# Patient Record
Sex: FEMALE | Race: BLACK OR AFRICAN AMERICAN | NOT HISPANIC OR LATINO | Employment: UNEMPLOYED | RURAL
[De-identification: names, ages, dates, MRNs, and addresses within clinical notes are randomized per-mention and may not be internally consistent; named-entity substitution may affect disease eponyms.]

---

## 2018-06-20 ENCOUNTER — HISTORICAL (OUTPATIENT)
Dept: ADMINISTRATIVE | Facility: HOSPITAL | Age: 38
End: 2018-06-20

## 2018-06-22 LAB
LAB AP CLINICAL INFORMATION: NORMAL
LAB AP GENERAL CAT - HISTORICAL: NORMAL
LAB AP INTERPRETATION/RESULT - HISTORICAL: NEGATIVE
LAB AP SPECIMEN ADEQUACY - HISTORICAL: NORMAL
LAB AP SPECIMEN SUBMITTED - HISTORICAL: NORMAL

## 2020-08-19 ENCOUNTER — HISTORICAL (OUTPATIENT)
Dept: ADMINISTRATIVE | Facility: HOSPITAL | Age: 40
End: 2020-08-19

## 2022-05-18 ENCOUNTER — OFFICE VISIT (OUTPATIENT)
Dept: FAMILY MEDICINE | Facility: CLINIC | Age: 42
End: 2022-05-18
Payer: COMMERCIAL

## 2022-05-18 DIAGNOSIS — Z00.00 WELLNESS EXAMINATION: Primary | ICD-10-CM

## 2022-05-18 PROBLEM — M25.551 CHRONIC RIGHT HIP PAIN: Status: ACTIVE | Noted: 2022-05-18

## 2022-05-18 PROBLEM — I10 PRIMARY HYPERTENSION: Status: ACTIVE | Noted: 2022-05-18

## 2022-05-18 PROBLEM — E66.9 OBESITY: Status: ACTIVE | Noted: 2022-05-18

## 2022-05-18 PROBLEM — F32.A DEPRESSION: Status: ACTIVE | Noted: 2022-05-18

## 2022-05-18 PROBLEM — G89.29 CHRONIC RIGHT HIP PAIN: Status: ACTIVE | Noted: 2022-05-18

## 2022-05-18 PROBLEM — T78.40XA ALLERGIES: Status: ACTIVE | Noted: 2022-05-18

## 2022-05-18 LAB
CHOLEST SERPL-MCNC: 179 MG/DL (ref 0–200)
CHOLEST/HDLC SERPL: 3.4 {RATIO}
GLUCOSE SERPL-MCNC: 103 MG/DL (ref 74–106)
HDLC SERPL-MCNC: 53 MG/DL (ref 40–60)
LDLC SERPL CALC-MCNC: 104 MG/DL
LDLC/HDLC SERPL: 2 {RATIO}
NONHDLC SERPL-MCNC: 126 MG/DL
TRIGL SERPL-MCNC: 112 MG/DL (ref 35–150)
VLDLC SERPL-MCNC: 22 MG/DL

## 2022-05-18 PROCEDURE — 82947 ASSAY GLUCOSE BLOOD QUANT: CPT | Mod: ,,, | Performed by: CLINICAL MEDICAL LABORATORY

## 2022-05-18 PROCEDURE — 99386 PR PREVENTIVE VISIT,NEW,40-64: ICD-10-PCS | Mod: ,,, | Performed by: NURSE PRACTITIONER

## 2022-05-18 PROCEDURE — 4010F PR ACE/ARB THEARPY RXD/TAKEN: ICD-10-PCS | Mod: CPTII,,, | Performed by: NURSE PRACTITIONER

## 2022-05-18 PROCEDURE — 99386 PREV VISIT NEW AGE 40-64: CPT | Mod: ,,, | Performed by: NURSE PRACTITIONER

## 2022-05-18 PROCEDURE — 82947 GLUCOSE, FASTING: ICD-10-PCS | Mod: ,,, | Performed by: CLINICAL MEDICAL LABORATORY

## 2022-05-18 PROCEDURE — 4010F ACE/ARB THERAPY RXD/TAKEN: CPT | Mod: CPTII,,, | Performed by: NURSE PRACTITIONER

## 2022-05-18 PROCEDURE — 80061 LIPID PANEL: CPT | Mod: ,,, | Performed by: CLINICAL MEDICAL LABORATORY

## 2022-05-18 PROCEDURE — 80061 LIPID PANEL: ICD-10-PCS | Mod: ,,, | Performed by: CLINICAL MEDICAL LABORATORY

## 2022-05-18 RX ORDER — LISINOPRIL AND HYDROCHLOROTHIAZIDE 12.5; 2 MG/1; MG/1
1 TABLET ORAL DAILY
COMMUNITY

## 2022-05-18 RX ORDER — PHENTERMINE HYDROCHLORIDE 37.5 MG/1
37.5 CAPSULE ORAL EVERY MORNING
COMMUNITY

## 2022-05-18 RX ORDER — SERTRALINE HYDROCHLORIDE 50 MG/1
50 TABLET, FILM COATED ORAL DAILY
COMMUNITY

## 2022-05-18 RX ORDER — CETIRIZINE HYDROCHLORIDE 10 MG/1
10 TABLET ORAL DAILY
COMMUNITY

## 2022-05-18 RX ORDER — TRAMADOL HYDROCHLORIDE 50 MG/1
50 TABLET ORAL EVERY 6 HOURS PRN
COMMUNITY
End: 2023-10-09

## 2023-06-20 ENCOUNTER — OFFICE VISIT (OUTPATIENT)
Dept: GASTROENTEROLOGY | Facility: CLINIC | Age: 43
End: 2023-06-20
Payer: COMMERCIAL

## 2023-06-20 VITALS
HEIGHT: 62 IN | WEIGHT: 293 LBS | BODY MASS INDEX: 53.92 KG/M2 | SYSTOLIC BLOOD PRESSURE: 116 MMHG | DIASTOLIC BLOOD PRESSURE: 61 MMHG

## 2023-06-20 DIAGNOSIS — R10.84 GENERALIZED ABDOMINAL PAIN: Primary | ICD-10-CM

## 2023-06-20 DIAGNOSIS — K76.0 FATTY LIVER: ICD-10-CM

## 2023-06-20 PROCEDURE — 3078F DIAST BP <80 MM HG: CPT | Mod: CPTII,,, | Performed by: NURSE PRACTITIONER

## 2023-06-20 PROCEDURE — 99214 OFFICE O/P EST MOD 30 MIN: CPT | Mod: PBBFAC | Performed by: NURSE PRACTITIONER

## 2023-06-20 PROCEDURE — 3078F PR MOST RECENT DIASTOLIC BLOOD PRESSURE < 80 MM HG: ICD-10-PCS | Mod: CPTII,,, | Performed by: NURSE PRACTITIONER

## 2023-06-20 PROCEDURE — 1159F MED LIST DOCD IN RCRD: CPT | Mod: CPTII,,, | Performed by: NURSE PRACTITIONER

## 2023-06-20 PROCEDURE — 1159F PR MEDICATION LIST DOCUMENTED IN MEDICAL RECORD: ICD-10-PCS | Mod: CPTII,,, | Performed by: NURSE PRACTITIONER

## 2023-06-20 PROCEDURE — 3008F BODY MASS INDEX DOCD: CPT | Mod: CPTII,,, | Performed by: NURSE PRACTITIONER

## 2023-06-20 PROCEDURE — 99204 OFFICE O/P NEW MOD 45 MIN: CPT | Mod: S$PBB,,, | Performed by: NURSE PRACTITIONER

## 2023-06-20 PROCEDURE — 3074F SYST BP LT 130 MM HG: CPT | Mod: CPTII,,, | Performed by: NURSE PRACTITIONER

## 2023-06-20 PROCEDURE — 99204 PR OFFICE/OUTPT VISIT, NEW, LEVL IV, 45-59 MIN: ICD-10-PCS | Mod: S$PBB,,, | Performed by: NURSE PRACTITIONER

## 2023-06-20 PROCEDURE — 3008F PR BODY MASS INDEX (BMI) DOCUMENTED: ICD-10-PCS | Mod: CPTII,,, | Performed by: NURSE PRACTITIONER

## 2023-06-20 PROCEDURE — 4010F PR ACE/ARB THEARPY RXD/TAKEN: ICD-10-PCS | Mod: CPTII,,, | Performed by: NURSE PRACTITIONER

## 2023-06-20 PROCEDURE — 3074F PR MOST RECENT SYSTOLIC BLOOD PRESSURE < 130 MM HG: ICD-10-PCS | Mod: CPTII,,, | Performed by: NURSE PRACTITIONER

## 2023-06-20 PROCEDURE — 4010F ACE/ARB THERAPY RXD/TAKEN: CPT | Mod: CPTII,,, | Performed by: NURSE PRACTITIONER

## 2023-06-20 RX ORDER — ERGOCALCIFEROL 1.25 MG/1
CAPSULE ORAL
COMMUNITY
Start: 2023-03-28

## 2023-06-20 RX ORDER — TRAZODONE HYDROCHLORIDE 50 MG/1
50 TABLET ORAL NIGHTLY PRN
COMMUNITY
Start: 2023-01-09

## 2023-06-20 RX ORDER — FOLIC ACID 1 MG/1
1000 TABLET ORAL
COMMUNITY
Start: 2023-03-28

## 2023-06-20 RX ORDER — DICLOFENAC POTASSIUM 50 MG/1
50 TABLET, FILM COATED ORAL 2 TIMES DAILY PRN
COMMUNITY
Start: 2023-05-02

## 2023-06-20 RX ORDER — HYDROCODONE BITARTRATE AND ACETAMINOPHEN 5; 325 MG/1; MG/1
1 TABLET ORAL 2 TIMES DAILY PRN
COMMUNITY
Start: 2023-05-02

## 2023-06-20 NOTE — PROGRESS NOTES
Tracey Gaffney is a 43 y.o. female here for Abdominal Pain        PCP: Primary Doctor No  Referring Provider: No referring provider defined for this encounter.     HPI:  Presents with report of generalized abdominal pain.  Patient did have an abdominal ultrasound at [facility], report reviewed, hepatic steatosis.  Prior cholecystectomy.  She reports that abdominal pain is generalized and she can not identify any trigger.  Reports the pain initially started in the right lower quadrant.  No previous appendectomy.  Abdominal pain is intermittent.  Reports that it is sharp.  No hematochezia or melena.  Denies any heartburn, nausea, or vomiting.  Reports that she is on a weight loss diet in preparation for hip surgery.  We did discuss weight loss of 7-10% body weight due to fatty liver.  BMI is 59.44.  Reports that stools are loose following gallbladder surgery.  This is not new.  No family history of colon cancer.    Abdominal Pain  This is a new problem. The current episode started more than 1 month ago. The onset quality is undetermined. The problem occurs intermittently. The problem has been unchanged. The pain is moderate. The quality of the pain is sharp. The abdominal pain does not radiate. Associated symptoms include arthralgias and diarrhea (loose after gallbladder surgery). Pertinent negatives include no constipation, fever, nausea or vomiting. Nothing aggravates the pain. The pain is relieved by Nothing.       ROS:  Review of Systems   Constitutional:  Negative for appetite change, fatigue, fever and unexpected weight change.   HENT:  Negative for trouble swallowing.    Cardiovascular:  Negative for chest pain.   Gastrointestinal:  Positive for abdominal pain (intermittent sharp pain) and diarrhea (loose after gallbladder surgery). Negative for blood in stool, change in bowel habit, constipation, nausea, vomiting, reflux and change in bowel habit.   Musculoskeletal:  Positive for arthralgias and gait  "problem (cane).   Integumentary:  Negative for pallor.   Neurological:  Negative for light-headedness.   Psychiatric/Behavioral:  The patient is not nervous/anxious.         PMHX:  has no past medical history on file.    PSHX:  has a past surgical history that includes Hernia repair; Hysterectomy; and Cholecystectomy.    PFHX: family history is not on file.    PSlHX:  reports that she has never smoked. She has never used smokeless tobacco. She reports current alcohol use. She reports that she does not use drugs.        Review of patient's allergies indicates:  No Known Allergies    Medication List with Changes/Refills   Current Medications    CETIRIZINE (ZYRTEC) 10 MG TABLET    Take 10 mg by mouth once daily.    DICLOFENAC (CATAFLAM) 50 MG TABLET    Take 50 mg by mouth 2 (two) times daily as needed.    ERGOCALCIFEROL (ERGOCALCIFEROL) 50,000 UNIT CAP    Take by mouth.    FOLIC ACID (FOLVITE) 1 MG TABLET    Take 1,000 mcg by mouth.    HYDROCODONE-ACETAMINOPHEN (NORCO) 5-325 MG PER TABLET    Take 1 tablet by mouth 2 (two) times daily as needed.    LISINOPRIL-HYDROCHLOROTHIAZIDE (PRINZIDE,ZESTORETIC) 20-12.5 MG PER TABLET    Take 1 tablet by mouth once daily.    PHENTERMINE 37.5 MG CAPSULE    Take 37.5 mg by mouth every morning.    SERTRALINE (ZOLOFT) 50 MG TABLET    TAKE 1 TABLET BY MOUTH AT BEDTIME FOR DEPRESSION AND HOT FLASHES.    SERTRALINE (ZOLOFT) 50 MG TABLET    Take 50 mg by mouth once daily.    TRAMADOL (ULTRAM) 50 MG TABLET    Take 50 mg by mouth every 6 (six) hours as needed for Pain.    TRAZODONE (DESYREL) 50 MG TABLET    Take 50 mg by mouth nightly as needed.        Objective Findings:  Vital Signs:  /61   Ht 5' 2" (1.575 m)   Wt (!) 147.4 kg (325 lb)   BMI 59.44 kg/m²  Body mass index is 59.44 kg/m².    Physical Exam:  Physical Exam  Vitals and nursing note reviewed.   Constitutional:       General: She is not in acute distress.     Appearance: Normal appearance. She is obese.   HENT:      " Mouth/Throat:      Mouth: Mucous membranes are moist.   Cardiovascular:      Rate and Rhythm: Normal rate.   Pulmonary:      Effort: Pulmonary effort is normal.      Breath sounds: No wheezing, rhonchi or rales.   Abdominal:      General: Bowel sounds are normal. There is no distension.      Palpations: Abdomen is soft. There is no mass.      Tenderness: There is no abdominal tenderness. There is no guarding.   Skin:     General: Skin is warm and dry.      Coloration: Skin is not jaundiced or pale.   Neurological:      Mental Status: She is alert and oriented to person, place, and time.      Gait: Gait abnormal.   Psychiatric:         Mood and Affect: Mood normal.        Labs:  No results found for: WBC, HGB, HCT, MCV, RDW, PLT, GRAN, LYMPH, MONO, EOS, BASO  No results found for: NA, K, CL, CO2, GLU, BUN, CREATININE, CALCIUM, PROT, ALBUMIN, BILITOT, ALKPHOS, AST, ALT      Imaging: No results found.      Assessment:  Tracey Gaffney is a 43 y.o. female here with:  1. Generalized abdominal pain    2. Fatty liver          Recommendations:  1. Ct abd and pelvis for generalized abdominal pain, abdominal ultrasound already performed  2. CBC, CMP, hepatitis-B surface antibody, hepatitis a total antibody, hepatitis-C  3. Exercise 150 minutes per week  Weight loss of 7-10%. Weight loss should be gradual  Diet low in saturated fats and carbohydrates  Good glucose and cholesterol control      Follow up in about 4 weeks (around 7/18/2023).      Order summary:  Orders Placed This Encounter    CT Abdomen Pelvis With Contrast    CBC Auto Differential    Comprehensive Metabolic Panel    Hepatitis B Surface Ab, Qualitative    Hepatitis A Antibody, Total    Hepatitis C Antibody       Thank you for allowing me to participate in the care of Tracey Gaffney.      SANA Harrison

## 2023-07-03 ENCOUNTER — TELEPHONE (OUTPATIENT)
Dept: GASTROENTEROLOGY | Facility: CLINIC | Age: 43
End: 2023-07-03
Payer: COMMERCIAL

## 2023-07-03 DIAGNOSIS — R10.84 GENERALIZED ABDOMINAL PAIN: Primary | ICD-10-CM

## 2023-07-03 NOTE — TELEPHONE ENCOUNTER
Called patient to give appointment for US abd due to letter from pre cert stating that the [employer] insurance would not cover CT if she didn't have US first. Patient states she had US abd at [place] on 6/26/23. Patient will have them fax report to us at [phone]

## 2023-07-13 ENCOUNTER — TELEPHONE (OUTPATIENT)
Dept: GASTROENTEROLOGY | Facility: CLINIC | Age: 43
End: 2023-07-13
Payer: COMMERCIAL

## 2023-07-13 NOTE — TELEPHONE ENCOUNTER
Call to the patient's insurance company to discuss recent denial of CT abdomen and pelvis due to generalized abdominal pain.  The patient's pain initially started in the right upper quadrant and abdominal ultrasound was performed but not diagnostic with the exception of hepatic steatosis.  The pain is now diffuse and generalized abdominal pain.  Insurance company reports that at this time. A peer to peer. would not give authorization for this testing.  States that the order request for CT abdomen and pelvis has been denied twice.  Reports that at this point an appeal has to be initiated.  Tracking number [id number].

## 2023-07-27 ENCOUNTER — TELEPHONE (OUTPATIENT)
Dept: GASTROENTEROLOGY | Facility: CLINIC | Age: 43
End: 2023-07-27
Payer: COMMERCIAL

## 2023-07-27 DIAGNOSIS — R10.84 GENERALIZED ABDOMINAL PAIN: Primary | ICD-10-CM

## 2023-07-27 NOTE — TELEPHONE ENCOUNTER
Called patient to give update on status of appeal. Patient states she has not yet received forms in the mail from her insurance company, but when she does she will fill out and turn in to our office. Notified patient to turn in a urine sample to lab at her earliest covience. Patient verbalized understanding.              ----- Message from Mary Ellen Rehman sent at 7/27/2023  8:57 AM CDT -----  Good Morning, I spoke with Appeals Team from [facility] because I had been checking online for an update on this request, but there was no update on the appeal. I spoke with Barbara. She stated that patient's insurance mailed out an appeal letter form for patient to fill out and sign. She stated that patient should return this form to the doctor office. Once this has been done it would need to be downloaded on [facility] web portal. She states that it should take a day or so for this information to be reviewed once submitted.     Once you guys receive the information from the patient, could you please download the information into  and I will upload it to their web portal.also, she said they only clinical information that they need now is a urinalysis.      Thank you   Mary Ellen

## 2023-07-28 ENCOUNTER — TELEPHONE (OUTPATIENT)
Dept: GASTROENTEROLOGY | Facility: CLINIC | Age: 43
End: 2023-07-28
Payer: COMMERCIAL

## 2023-07-28 NOTE — TELEPHONE ENCOUNTER
Returned call to patient. States she realized the order was for urine and not blood.          ----- Message from Janice Damon sent at 7/28/2023  8:45 AM CDT -----  Question about urinalysis?

## 2023-08-10 DIAGNOSIS — N39.0 URINARY TRACT INFECTION WITHOUT HEMATURIA, SITE UNSPECIFIED: Primary | ICD-10-CM

## 2023-08-10 RX ORDER — NITROFURANTOIN 25; 75 MG/1; MG/1
100 CAPSULE ORAL 2 TIMES DAILY
Qty: 14 CAPSULE | Refills: 0 | Status: SHIPPED | OUTPATIENT
Start: 2023-08-10 | End: 2023-08-17

## 2023-08-30 ENCOUNTER — OFFICE VISIT (OUTPATIENT)
Dept: GASTROENTEROLOGY | Facility: CLINIC | Age: 43
End: 2023-08-30
Payer: COMMERCIAL

## 2023-08-30 VITALS
HEART RATE: 71 BPM | DIASTOLIC BLOOD PRESSURE: 63 MMHG | BODY MASS INDEX: 53.92 KG/M2 | SYSTOLIC BLOOD PRESSURE: 109 MMHG | HEIGHT: 62 IN | WEIGHT: 293 LBS

## 2023-08-30 DIAGNOSIS — K76.0 FATTY LIVER: ICD-10-CM

## 2023-08-30 DIAGNOSIS — R10.30 LOWER ABDOMINAL PAIN: Primary | ICD-10-CM

## 2023-08-30 PROCEDURE — 3074F SYST BP LT 130 MM HG: CPT | Mod: CPTII,,, | Performed by: NURSE PRACTITIONER

## 2023-08-30 PROCEDURE — 3008F PR BODY MASS INDEX (BMI) DOCUMENTED: ICD-10-PCS | Mod: CPTII,,, | Performed by: NURSE PRACTITIONER

## 2023-08-30 PROCEDURE — 3008F BODY MASS INDEX DOCD: CPT | Mod: CPTII,,, | Performed by: NURSE PRACTITIONER

## 2023-08-30 PROCEDURE — 1159F PR MEDICATION LIST DOCUMENTED IN MEDICAL RECORD: ICD-10-PCS | Mod: CPTII,,, | Performed by: NURSE PRACTITIONER

## 2023-08-30 PROCEDURE — 3074F PR MOST RECENT SYSTOLIC BLOOD PRESSURE < 130 MM HG: ICD-10-PCS | Mod: CPTII,,, | Performed by: NURSE PRACTITIONER

## 2023-08-30 PROCEDURE — 1159F MED LIST DOCD IN RCRD: CPT | Mod: CPTII,,, | Performed by: NURSE PRACTITIONER

## 2023-08-30 PROCEDURE — 4010F PR ACE/ARB THEARPY RXD/TAKEN: ICD-10-PCS | Mod: CPTII,,, | Performed by: NURSE PRACTITIONER

## 2023-08-30 PROCEDURE — 99213 OFFICE O/P EST LOW 20 MIN: CPT | Mod: S$PBB,,, | Performed by: NURSE PRACTITIONER

## 2023-08-30 PROCEDURE — 1160F RVW MEDS BY RX/DR IN RCRD: CPT | Mod: CPTII,,, | Performed by: NURSE PRACTITIONER

## 2023-08-30 PROCEDURE — 1160F PR REVIEW ALL MEDS BY PRESCRIBER/CLIN PHARMACIST DOCUMENTED: ICD-10-PCS | Mod: CPTII,,, | Performed by: NURSE PRACTITIONER

## 2023-08-30 PROCEDURE — 3078F DIAST BP <80 MM HG: CPT | Mod: CPTII,,, | Performed by: NURSE PRACTITIONER

## 2023-08-30 PROCEDURE — 99213 PR OFFICE/OUTPT VISIT, EST, LEVL III, 20-29 MIN: ICD-10-PCS | Mod: S$PBB,,, | Performed by: NURSE PRACTITIONER

## 2023-08-30 PROCEDURE — 3078F PR MOST RECENT DIASTOLIC BLOOD PRESSURE < 80 MM HG: ICD-10-PCS | Mod: CPTII,,, | Performed by: NURSE PRACTITIONER

## 2023-08-30 PROCEDURE — 99214 OFFICE O/P EST MOD 30 MIN: CPT | Mod: PBBFAC | Performed by: NURSE PRACTITIONER

## 2023-08-30 PROCEDURE — 4010F ACE/ARB THERAPY RXD/TAKEN: CPT | Mod: CPTII,,, | Performed by: NURSE PRACTITIONER

## 2023-08-30 NOTE — PROGRESS NOTES
Tracey Gaffney is a 43 y.o. female here for No chief complaint on file.        PCP: No, Primary Doctor  Referring Provider: No referring provider defined for this encounter.     HPI:  Presents in follow-up due to abdominal pain.  Patient continues to report lower abdominal pain.  Urinalysis was completed at the last office visit, she did have a UTI with greater than 100,000 count E coli.  She was treated with Macrobid.  Reports that UTI symptoms have improved.  Despite treatment for UTI, patient continues to report lower abdominal pain that is intermittent and sharp.  CT was previously ordered and denied by insurance.  We have appealed.  Will check with precert.  Abdominal ultrasound performed at [facility] indicates fatty liver. Reports a previous history of enteritis in 2016. CT from 2016 reviewed by patient request on her phone. Denies constipation. States that she has had loose stool after eating for greater than 15 years due to prior cholecystectomy. No hematochezia or melena.  Reports that she needs to reschedule appointment for gyn.  States that she has had a partial hysterectomy.          ROS:  Review of Systems   Constitutional:  Negative for appetite change, fatigue, fever and unexpected weight change.   HENT:  Negative for trouble swallowing.    Respiratory:  Negative for shortness of breath.    Cardiovascular:  Negative for chest pain.   Gastrointestinal:  Positive for abdominal pain and diarrhea (loose after eating, prior cholecystectomy). Negative for blood in stool, change in bowel habit, constipation, nausea, vomiting, reflux and change in bowel habit.   Musculoskeletal:  Negative for gait problem.   Integumentary:  Negative for pallor.   Neurological:  Negative for light-headedness.   Psychiatric/Behavioral:  The patient is not nervous/anxious.           PMHX:  has no past medical history on file.    PSHX:  has a past surgical history that includes Hernia repair; Hysterectomy; and  "Cholecystectomy.    PFHX: family history is not on file.    PSlHX:  reports that she has never smoked. She has never used smokeless tobacco. She reports current alcohol use. She reports that she does not use drugs.        Review of patient's allergies indicates:  No Known Allergies    Medication List with Changes/Refills   Current Medications    CETIRIZINE (ZYRTEC) 10 MG TABLET    Take 10 mg by mouth once daily.    DICLOFENAC (CATAFLAM) 50 MG TABLET    Take 50 mg by mouth 2 (two) times daily as needed.    ERGOCALCIFEROL (ERGOCALCIFEROL) 50,000 UNIT CAP    Take by mouth.    FOLIC ACID (FOLVITE) 1 MG TABLET    Take 1,000 mcg by mouth.    HYDROCODONE-ACETAMINOPHEN (NORCO) 5-325 MG PER TABLET    Take 1 tablet by mouth 2 (two) times daily as needed.    LISINOPRIL-HYDROCHLOROTHIAZIDE (PRINZIDE,ZESTORETIC) 20-12.5 MG PER TABLET    Take 1 tablet by mouth once daily.    PHENTERMINE 37.5 MG CAPSULE    Take 37.5 mg by mouth every morning.    SERTRALINE (ZOLOFT) 50 MG TABLET    TAKE 1 TABLET BY MOUTH AT BEDTIME FOR DEPRESSION AND HOT FLASHES.    SERTRALINE (ZOLOFT) 50 MG TABLET    Take 50 mg by mouth once daily.    TRAMADOL (ULTRAM) 50 MG TABLET    Take 50 mg by mouth every 6 (six) hours as needed for Pain.    TRAZODONE (DESYREL) 50 MG TABLET    Take 50 mg by mouth nightly as needed.        Objective Findings:  Vital Signs:  /63   Pulse 71   Ht 5' 2" (1.575 m)   Wt (!) 147.3 kg (324 lb 12.8 oz)   BMI 59.41 kg/m²  Body mass index is 59.41 kg/m².    Physical Exam:  Physical Exam  Vitals and nursing note reviewed.   Constitutional:       General: She is not in acute distress.     Appearance: Normal appearance. She is obese.   HENT:      Mouth/Throat:      Mouth: Mucous membranes are moist.   Cardiovascular:      Rate and Rhythm: Normal rate.   Pulmonary:      Effort: Pulmonary effort is normal.      Breath sounds: No wheezing, rhonchi or rales.   Abdominal:      General: Bowel sounds are normal. There is no distension. "      Palpations: Abdomen is soft. There is no mass.      Tenderness: There is abdominal tenderness. There is no guarding.      Hernia: No hernia is present.   Skin:     General: Skin is warm and dry.      Coloration: Skin is not jaundiced or pale.   Neurological:      Mental Status: She is alert and oriented to person, place, and time.   Psychiatric:         Mood and Affect: Mood normal.          Labs:  Lab Results   Component Value Date    WBC 7.46 06/20/2023    HGB 11.8 (L) 06/20/2023    HCT 38.6 06/20/2023    MCV 89.1 06/20/2023    RDW 14.2 06/20/2023     06/20/2023    LYMPH 46.0 (H) 06/20/2023    LYMPH 3.43 06/20/2023    MONO 8.7 (H) 06/20/2023    EOS 0.14 06/20/2023    BASO 0.04 06/20/2023     Lab Results   Component Value Date     06/20/2023    K 4.6 06/20/2023     06/20/2023    CO2 26 06/20/2023    GLU 89 06/20/2023    BUN 14 06/20/2023    CREATININE 1.00 06/20/2023    CALCIUM 9.5 06/20/2023    PROT 7.2 06/20/2023    ALBUMIN 3.4 (L) 06/20/2023    BILITOT 0.2 06/20/2023    ALKPHOS 90 06/20/2023    AST 21 06/20/2023    ALT 16 06/20/2023         Imaging: No results found.      Assessment:  Tracey Gaffney is a 43 y.o. female here with:  1. Lower abdominal pain    2. Fatty liver          Recommendations:  1. Will contact precert regarding scheduling CT  2. Exercise 150 minutes per week  Weight loss of 7-10%. Weight loss should be gradual  Diet low in saturated fats and carbohydrates  Good glucose and cholesterol control      Follow up in about 3 months (around 11/30/2023).      Order summary:       Thank you for allowing me to participate in the care of Tracey Gaffney.      SANA Harrison

## 2023-08-30 NOTE — PATIENT INSTRUCTIONS
Recommend Hepatitis A and B vaccine    Exercise 150 minutes per week  Weight loss of 7-10%. Weight loss should be gradual  Diet low in saturated fats and carbohydrates  Good glucose and cholesterol control

## 2023-08-31 ENCOUNTER — PATIENT MESSAGE (OUTPATIENT)
Dept: GASTROENTEROLOGY | Facility: CLINIC | Age: 43
End: 2023-08-31
Payer: COMMERCIAL

## 2023-09-14 ENCOUNTER — HOSPITAL ENCOUNTER (OUTPATIENT)
Dept: RADIOLOGY | Facility: HOSPITAL | Age: 43
Discharge: HOME | End: 2023-09-14
Attending: NURSE PRACTITIONER
Payer: COMMERCIAL

## 2023-09-14 DIAGNOSIS — R10.84 GENERALIZED ABDOMINAL PAIN: ICD-10-CM

## 2023-09-14 PROCEDURE — 74177 CT ABD & PELVIS W/CONTRAST: CPT | Mod: TC

## 2023-09-14 PROCEDURE — 74177 CT ABD & PELVIS W/CONTRAST: CPT | Mod: 26,,, | Performed by: RADIOLOGY

## 2023-09-14 PROCEDURE — 25500020 PHARM REV CODE 255: Performed by: NURSE PRACTITIONER

## 2023-09-14 PROCEDURE — 74177 CT ABDOMEN PELVIS WITH CONTRAST: ICD-10-PCS | Mod: 26,,, | Performed by: RADIOLOGY

## 2023-09-14 RX ADMIN — IOPAMIDOL 100 ML: 755 INJECTION, SOLUTION INTRAVENOUS at 10:09

## 2023-10-05 ENCOUNTER — PATIENT MESSAGE (OUTPATIENT)
Dept: GASTROENTEROLOGY | Facility: CLINIC | Age: 43
End: 2023-10-05
Payer: COMMERCIAL

## 2023-10-09 ENCOUNTER — OFFICE VISIT (OUTPATIENT)
Dept: FAMILY MEDICINE | Facility: CLINIC | Age: 43
End: 2023-10-09
Payer: COMMERCIAL

## 2023-10-09 VITALS
WEIGHT: 293 LBS | HEART RATE: 78 BPM | TEMPERATURE: 98 F | BODY MASS INDEX: 53.92 KG/M2 | RESPIRATION RATE: 20 BRPM | HEIGHT: 62 IN | OXYGEN SATURATION: 96 % | SYSTOLIC BLOOD PRESSURE: 114 MMHG | DIASTOLIC BLOOD PRESSURE: 83 MMHG

## 2023-10-09 DIAGNOSIS — I10 PRIMARY HYPERTENSION: ICD-10-CM

## 2023-10-09 DIAGNOSIS — Z00.00 GENERAL MEDICAL EXAM: Primary | ICD-10-CM

## 2023-10-09 DIAGNOSIS — Z12.31 ENCOUNTER FOR SCREENING MAMMOGRAM FOR MALIGNANT NEOPLASM OF BREAST: ICD-10-CM

## 2023-10-09 DIAGNOSIS — Z13.1 SCREENING FOR DIABETES MELLITUS: ICD-10-CM

## 2023-10-09 DIAGNOSIS — Z13.220 SCREENING FOR LIPID DISORDERS: ICD-10-CM

## 2023-10-09 PROBLEM — M16.11 OSTEOARTHRITIS OF RIGHT HIP: Status: ACTIVE | Noted: 2023-08-09

## 2023-10-09 LAB
CHOLEST SERPL-MCNC: 184 MG/DL (ref 0–200)
CHOLEST/HDLC SERPL: 3.8 {RATIO}
EST. AVERAGE GLUCOSE BLD GHB EST-MCNC: 110 MG/DL
GLUCOSE SERPL-MCNC: 89 MG/DL (ref 74–106)
HBA1C MFR BLD HPLC: 5.9 % (ref 4.5–6.6)
HDLC SERPL-MCNC: 49 MG/DL (ref 40–60)
LDLC SERPL CALC-MCNC: 110 MG/DL
LDLC/HDLC SERPL: 2.2 {RATIO}
NONHDLC SERPL-MCNC: 135 MG/DL
TRIGL SERPL-MCNC: 126 MG/DL (ref 35–150)
VLDLC SERPL-MCNC: 25 MG/DL

## 2023-10-09 PROCEDURE — 3044F HG A1C LEVEL LT 7.0%: CPT | Mod: CPTII,,, | Performed by: NURSE PRACTITIONER

## 2023-10-09 PROCEDURE — 3008F PR BODY MASS INDEX (BMI) DOCUMENTED: ICD-10-PCS | Mod: CPTII,,, | Performed by: NURSE PRACTITIONER

## 2023-10-09 PROCEDURE — 3074F PR MOST RECENT SYSTOLIC BLOOD PRESSURE < 130 MM HG: ICD-10-PCS | Mod: CPTII,,, | Performed by: NURSE PRACTITIONER

## 2023-10-09 PROCEDURE — 82947 ASSAY GLUCOSE BLOOD QUANT: CPT | Mod: ,,, | Performed by: CLINICAL MEDICAL LABORATORY

## 2023-10-09 PROCEDURE — 3079F PR MOST RECENT DIASTOLIC BLOOD PRESSURE 80-89 MM HG: ICD-10-PCS | Mod: CPTII,,, | Performed by: NURSE PRACTITIONER

## 2023-10-09 PROCEDURE — 3044F PR MOST RECENT HEMOGLOBIN A1C LEVEL <7.0%: ICD-10-PCS | Mod: CPTII,,, | Performed by: NURSE PRACTITIONER

## 2023-10-09 PROCEDURE — 4010F PR ACE/ARB THEARPY RXD/TAKEN: ICD-10-PCS | Mod: CPTII,,, | Performed by: NURSE PRACTITIONER

## 2023-10-09 PROCEDURE — 3008F BODY MASS INDEX DOCD: CPT | Mod: CPTII,,, | Performed by: NURSE PRACTITIONER

## 2023-10-09 PROCEDURE — 4010F ACE/ARB THERAPY RXD/TAKEN: CPT | Mod: CPTII,,, | Performed by: NURSE PRACTITIONER

## 2023-10-09 PROCEDURE — 80061 LIPID PANEL: ICD-10-PCS | Mod: GZ,,, | Performed by: CLINICAL MEDICAL LABORATORY

## 2023-10-09 PROCEDURE — 99396 PR PREVENTIVE VISIT,EST,40-64: ICD-10-PCS | Mod: ,,, | Performed by: NURSE PRACTITIONER

## 2023-10-09 PROCEDURE — 3074F SYST BP LT 130 MM HG: CPT | Mod: CPTII,,, | Performed by: NURSE PRACTITIONER

## 2023-10-09 PROCEDURE — 83036 HEMOGLOBIN A1C: ICD-10-PCS | Mod: GZ,,, | Performed by: CLINICAL MEDICAL LABORATORY

## 2023-10-09 PROCEDURE — 3079F DIAST BP 80-89 MM HG: CPT | Mod: CPTII,,, | Performed by: NURSE PRACTITIONER

## 2023-10-09 PROCEDURE — 82947 GLUCOSE, FASTING: ICD-10-PCS | Mod: ,,, | Performed by: CLINICAL MEDICAL LABORATORY

## 2023-10-09 PROCEDURE — 80061 LIPID PANEL: CPT | Mod: GZ,,, | Performed by: CLINICAL MEDICAL LABORATORY

## 2023-10-09 PROCEDURE — 99396 PREV VISIT EST AGE 40-64: CPT | Mod: ,,, | Performed by: NURSE PRACTITIONER

## 2023-10-09 PROCEDURE — 83036 HEMOGLOBIN GLYCOSYLATED A1C: CPT | Mod: GZ,,, | Performed by: CLINICAL MEDICAL LABORATORY

## 2023-10-09 RX ORDER — FLUCONAZOLE 150 MG/1
150 TABLET ORAL DAILY
COMMUNITY
Start: 2023-10-02

## 2023-10-09 RX ORDER — METFORMIN HYDROCHLORIDE 500 MG/1
500 TABLET ORAL 2 TIMES DAILY WITH MEALS
COMMUNITY
Start: 2023-09-05 | End: 2023-10-10 | Stop reason: ALTCHOICE

## 2023-10-09 NOTE — PATIENT INSTRUCTIONS
We will call you with results as soon as they return.   You should hear about your mammogram appointment soon.   Return if you need anything in the meantime.

## 2023-10-09 NOTE — PROGRESS NOTES
DIVYA Julien   [place] [address] [place]  [address]     PATIENT NAME: Tracey Gaffney  : 1980  DATE: 10/9/23  MRN: [id number]      Billing Provider: DIVYA Julien  Level of Service: CO PREVENTIVE VISIT,EST,40-64  Patient PCP Information       Provider PCP Type    Uri Bridges Jr, MD General            Reason for Visit / Chief Complaint: Wellness ([facility])       Update PCP  Update Chief Complaint         History of Present Illness / Problem Focused Workflow     Tracey Gaffney presents to the clinic with Wellness ([facility])     42 y/o female presents for wellness. Hx of hypertension, /83 today. Typically runs less than 80 diastolic on her antihypertensive. Also recently given samples of Trijardy XR by her weight loss doctor to start taking. Has been taking them only for a few days. She did come fasting this morning, last ate around 7 pm last night. Has been 2 years since her last mammogram, which was normal. Would like to set up her next one at [facility] if it is time. Had hysterectomy in 2019, they only left her ovaries so she was told she will no longer need pap smears. She does think she may be going through menopause, because she has been having hot flashes and mood swings for about 8 months now. Is in pain management for osteoarthritic pain, just changed her from tramadol to norco to try to get better control. She uses cane to help with ambulation.         Review of Systems     Review of Systems   Constitutional: Negative.    HENT: Negative.     Eyes: Negative.    Respiratory: Negative.     Cardiovascular: Negative.    Gastrointestinal: Negative.    Genitourinary:  Positive for hot flashes.   Musculoskeletal: Negative.    Integumentary:  Negative.   Neurological: Negative.    Psychiatric/Behavioral: Negative.     All other systems reviewed and are negative.       Medical / Social / Family History   History reviewed. No pertinent past medical  history.    Past Surgical History:   Procedure Laterality Date    CHOLECYSTECTOMY      HERNIA REPAIR      HYSTERECTOMY         Social History  Ms. Gaffney  reports that she has never smoked. She has never used smokeless tobacco. She reports current alcohol use. She reports that she does not use drugs.    Family History  Ms.'s Gaffney family history is not on file.    Medications and Allergies     Medications  Outpatient Medications Marked as Taking for the 10/9/23 encounter (Office Visit) with Bonnie Viveros FNP   Medication Sig Dispense Refill    cetirizine (ZYRTEC) 10 MG tablet Take 10 mg by mouth once daily.      diclofenac (CATAFLAM) 50 MG tablet Take 50 mg by mouth 2 (two) times daily as needed.      empaglifloz-linaglip-metformin 5-2.5-1,000 mg TBph Take by mouth.      ergocalciferol (ERGOCALCIFEROL) 50,000 unit Cap Take by mouth.      fluconazole (DIFLUCAN) 150 MG Tab Take 150 mg by mouth once daily.      folic acid (FOLVITE) 1 MG tablet Take 1,000 mcg by mouth.      lisinopriL-hydrochlorothiazide (PRINZIDE,ZESTORETIC) 20-12.5 mg per tablet Take 1 tablet by mouth once daily.      metFORMIN (GLUCOPHAGE) 500 MG tablet Take 500 mg by mouth 2 (two) times daily with meals.      phentermine 37.5 MG capsule Take 37.5 mg by mouth every morning.      sertraline (ZOLOFT) 50 MG tablet Take 50 mg by mouth once daily.      traZODone (DESYREL) 50 MG tablet Take 50 mg by mouth nightly as needed.         Allergies  Review of patient's allergies indicates:  No Known Allergies    Physical Examination     Vitals:    10/09/23 1013   BP: 114/83   Pulse: 78   Resp: 20   Temp: 97.8 °F (36.6 °C)     Physical Exam  Vitals and nursing note reviewed.   Constitutional:       Appearance: Normal appearance. She is obese.   HENT:      Head: Normocephalic and atraumatic.      Mouth/Throat:      Mouth: Mucous membranes are moist.      Pharynx: Oropharynx is clear.   Eyes:      Extraocular Movements: Extraocular movements intact.       Conjunctiva/sclera: Conjunctivae normal.      Pupils: Pupils are equal, round, and reactive to light.   Cardiovascular:      Rate and Rhythm: Normal rate and regular rhythm.      Pulses: Normal pulses.      Heart sounds: Normal heart sounds.   Pulmonary:      Effort: Pulmonary effort is normal.      Breath sounds: Normal breath sounds.   Musculoskeletal:         General: Normal range of motion.      Cervical back: Normal range of motion and neck supple.   Skin:     General: Skin is warm and dry.      Capillary Refill: Capillary refill takes less than 2 seconds.   Neurological:      General: No focal deficit present.      Mental Status: She is alert and oriented to person, place, and time. Mental status is at baseline.      Gait: Gait is intact.      Comments: Ambulatory with cane   Psychiatric:         Mood and Affect: Mood normal.         Behavior: Behavior normal.         Thought Content: Thought content normal.         Judgment: Judgment normal.              Assessment and Plan (including Health Maintenance)      Problem List  Smart Sets  Document Outside HM   :    Plan:   General medical exam  -     Lipid Panel; Future; Expected date: 10/09/2023  -     Glucose, Fasting; Future; Expected date: 10/09/2023  -     Hemoglobin A1C; Future; Expected date: 10/09/2023    BMI 50.0-59.9, adult  -     Glucose, Fasting; Future; Expected date: 10/09/2023    Screening for diabetes mellitus  -     Glucose, Fasting; Future; Expected date: 10/09/2023  -     Hemoglobin A1C; Future; Expected date: 10/09/2023    Screening for lipid disorders  -     Lipid Panel; Future; Expected date: 10/09/2023    Primary hypertension    Encounter for screening mammogram for malignant neoplasm of breast  -     Mammo Digital Screening Bilat; Future; Expected date: 10/09/2023           Health Maintenance Due   Topic Date Due    COVID-19 Vaccine (1) Never done    Pneumococcal Vaccines (Age 0-64) (1 - PCV) Never done    HIV Screening  Never done     TETANUS VACCINE  Never done    Mammogram  Never done    Hemoglobin A1c (Diabetic Prevention Screening)  Never done    Influenza Vaccine (1) Never done       Problem List Items Addressed This Visit          Cardiac/Vascular    Primary hypertension       Endocrine    BMI 50.0-59.9, adult    Relevant Orders    Glucose, Fasting     Other Visit Diagnoses       General medical exam    -  Primary    Relevant Orders    Lipid Panel    Glucose, Fasting    Hemoglobin A1C    Screening for diabetes mellitus        Relevant Orders    Glucose, Fasting    Hemoglobin A1C    Screening for lipid disorders        Relevant Orders    Lipid Panel    Encounter for screening mammogram for malignant neoplasm of breast        Relevant Orders    Mammo Digital Screening Bilat            The patient has no Health Maintenance topics of status Not Due    Future Appointments   Date Time Provider Department Center   12/4/2023 10:00 AM Janiya Dorsey FNP [facility]   10/10/2024 10:00 AM Bonnie Viveros FNP [facility] CARISA Perez        Patient Instructions   We will call you with results as soon as they return.   You should hear about your mammogram appointment soon.   Return if you need anything in the meantime.    Follow up in about 1 year (around 10/9/2024).     Signature:  DIVYA Julien      Date of encounter: 10/9/23

## 2023-10-10 DIAGNOSIS — R73.03 PREDIABETES: Primary | ICD-10-CM
